# Patient Record
Sex: MALE | Race: WHITE | NOT HISPANIC OR LATINO | RURAL
[De-identification: names, ages, dates, MRNs, and addresses within clinical notes are randomized per-mention and may not be internally consistent; named-entity substitution may affect disease eponyms.]

---

## 2021-11-19 ENCOUNTER — CLINICAL SUPPORT (OUTPATIENT)
Dept: PRIMARY CARE CLINIC | Facility: CLINIC | Age: 36
End: 2021-11-19

## 2021-11-19 DIAGNOSIS — Z02.89 ENCOUNTER FOR PHYSICAL EXAMINATION RELATED TO EMPLOYMENT: ICD-10-CM

## 2021-11-19 PROCEDURE — 99000 PR SPECIMEN HANDLING,DR OFF->LAB: ICD-10-PCS | Mod: ,,, | Performed by: NURSE PRACTITIONER

## 2021-11-19 PROCEDURE — 99000 SPECIMEN HANDLING OFFICE-LAB: CPT | Mod: ,,, | Performed by: NURSE PRACTITIONER

## 2021-11-19 PROCEDURE — 99172 OCULAR FUNCTION SCREEN: CPT | Mod: ,,, | Performed by: NURSE PRACTITIONER

## 2021-11-19 PROCEDURE — 92552 PURE TONE AUDIOMETRY AIR: CPT | Mod: ,,, | Performed by: NURSE PRACTITIONER

## 2021-11-19 PROCEDURE — 99080 OSHA QUESTIONNAIRE: ICD-10-PCS | Mod: ,,, | Performed by: NURSE PRACTITIONER

## 2021-11-19 PROCEDURE — 99080 SPECIAL REPORTS OR FORMS: CPT | Mod: ,,, | Performed by: NURSE PRACTITIONER

## 2021-11-19 PROCEDURE — 99172 PR VISUAL FUNCT SCREENING, BILAT: ICD-10-PCS | Mod: ,,, | Performed by: NURSE PRACTITIONER

## 2021-11-19 PROCEDURE — 92552 PR PURE TONE AUDIOMETRY, AIR: ICD-10-PCS | Mod: ,,, | Performed by: NURSE PRACTITIONER

## 2022-05-03 ENCOUNTER — CLINICAL SUPPORT (OUTPATIENT)
Dept: PRIMARY CARE CLINIC | Facility: CLINIC | Age: 37
End: 2022-05-03

## 2022-05-03 DIAGNOSIS — Z01.10 ENCOUNTER FOR HEARING EXAMINATION, UNSPECIFIED WHETHER ABNORMAL FINDINGS: Primary | ICD-10-CM

## 2022-05-03 DIAGNOSIS — Z02.83 ENCOUNTER FOR DRUG SCREENING: ICD-10-CM

## 2022-05-03 DIAGNOSIS — Z02.89 ENCOUNTER FOR PHYSICAL EXAMINATION RELATED TO EMPLOYMENT: ICD-10-CM

## 2022-05-03 PROCEDURE — 99499 PR PHYSICAL - BASIC NON DOT/CDL: ICD-10-PCS | Mod: ,,, | Performed by: NURSE PRACTITIONER

## 2022-05-03 PROCEDURE — 99499 UNLISTED E&M SERVICE: CPT | Mod: ,,, | Performed by: NURSE PRACTITIONER

## 2022-05-03 PROCEDURE — 92552 PR PURE TONE AUDIOMETRY, AIR: ICD-10-PCS | Mod: ,,, | Performed by: NURSE PRACTITIONER

## 2022-05-03 PROCEDURE — 92552 PURE TONE AUDIOMETRY AIR: CPT | Mod: ,,, | Performed by: NURSE PRACTITIONER

## 2022-05-03 PROCEDURE — 99000 PR SPECIMEN HANDLING,DR OFF->LAB: ICD-10-PCS | Mod: ,,, | Performed by: NURSE PRACTITIONER

## 2022-05-03 PROCEDURE — 99000 SPECIMEN HANDLING OFFICE-LAB: CPT | Mod: ,,, | Performed by: NURSE PRACTITIONER

## 2022-05-03 NOTE — PROGRESS NOTES
Subjective:       Patient ID: Paola Liu is a 36 y.o. male.    Chief Complaint: No chief complaint on file.    HPI  Review of Systems      Objective:      Physical Exam    Assessment:       Problem List Items Addressed This Visit    None     Visit Diagnoses     Encounter for hearing examination, unspecified whether abnormal findings    -  Primary    Encounter for drug screening        Encounter for physical examination related to employment              Plan:       See scanned documents in .

## 2022-05-17 ENCOUNTER — OFFICE VISIT (OUTPATIENT)
Dept: FAMILY MEDICINE | Facility: CLINIC | Age: 37
End: 2022-05-17
Payer: COMMERCIAL

## 2022-05-17 VITALS
SYSTOLIC BLOOD PRESSURE: 118 MMHG | DIASTOLIC BLOOD PRESSURE: 80 MMHG | RESPIRATION RATE: 17 BRPM | OXYGEN SATURATION: 96 % | WEIGHT: 180 LBS | HEART RATE: 67 BPM | HEIGHT: 67 IN | BODY MASS INDEX: 28.25 KG/M2 | TEMPERATURE: 98 F

## 2022-05-17 DIAGNOSIS — J02.9 SORE THROAT: ICD-10-CM

## 2022-05-17 DIAGNOSIS — J01.10 ACUTE NON-RECURRENT FRONTAL SINUSITIS: Primary | ICD-10-CM

## 2022-05-17 DIAGNOSIS — R05.9 COUGH: ICD-10-CM

## 2022-05-17 PROBLEM — S02.19XA: Status: ACTIVE | Noted: 2018-11-21

## 2022-05-17 PROBLEM — S02.129A: Status: ACTIVE | Noted: 2018-11-21

## 2022-05-17 LAB
CTP QC/QA: YES
S PYO RRNA THROAT QL PROBE: NEGATIVE

## 2022-05-17 PROCEDURE — 3074F SYST BP LT 130 MM HG: CPT | Mod: ,,, | Performed by: NURSE PRACTITIONER

## 2022-05-17 PROCEDURE — 1159F MED LIST DOCD IN RCRD: CPT | Mod: ,,, | Performed by: NURSE PRACTITIONER

## 2022-05-17 PROCEDURE — 87880 POCT RAPID STREP A: ICD-10-PCS | Mod: QW,,, | Performed by: NURSE PRACTITIONER

## 2022-05-17 PROCEDURE — 3079F PR MOST RECENT DIASTOLIC BLOOD PRESSURE 80-89 MM HG: ICD-10-PCS | Mod: ,,, | Performed by: NURSE PRACTITIONER

## 2022-05-17 PROCEDURE — 3074F PR MOST RECENT SYSTOLIC BLOOD PRESSURE < 130 MM HG: ICD-10-PCS | Mod: ,,, | Performed by: NURSE PRACTITIONER

## 2022-05-17 PROCEDURE — 3079F DIAST BP 80-89 MM HG: CPT | Mod: ,,, | Performed by: NURSE PRACTITIONER

## 2022-05-17 PROCEDURE — 1160F RVW MEDS BY RX/DR IN RCRD: CPT | Mod: ,,, | Performed by: NURSE PRACTITIONER

## 2022-05-17 PROCEDURE — 3008F BODY MASS INDEX DOCD: CPT | Mod: ,,, | Performed by: NURSE PRACTITIONER

## 2022-05-17 PROCEDURE — 3008F PR BODY MASS INDEX (BMI) DOCUMENTED: ICD-10-PCS | Mod: ,,, | Performed by: NURSE PRACTITIONER

## 2022-05-17 PROCEDURE — 87880 STREP A ASSAY W/OPTIC: CPT | Mod: QW,,, | Performed by: NURSE PRACTITIONER

## 2022-05-17 PROCEDURE — 99213 OFFICE O/P EST LOW 20 MIN: CPT | Mod: ,,, | Performed by: NURSE PRACTITIONER

## 2022-05-17 PROCEDURE — 1160F PR REVIEW ALL MEDS BY PRESCRIBER/CLIN PHARMACIST DOCUMENTED: ICD-10-PCS | Mod: ,,, | Performed by: NURSE PRACTITIONER

## 2022-05-17 PROCEDURE — 99213 PR OFFICE/OUTPT VISIT, EST, LEVL III, 20-29 MIN: ICD-10-PCS | Mod: ,,, | Performed by: NURSE PRACTITIONER

## 2022-05-17 PROCEDURE — 1159F PR MEDICATION LIST DOCUMENTED IN MEDICAL RECORD: ICD-10-PCS | Mod: ,,, | Performed by: NURSE PRACTITIONER

## 2022-05-17 RX ORDER — AZITHROMYCIN 250 MG/1
TABLET, FILM COATED ORAL
Qty: 6 TABLET | Refills: 0 | Status: SHIPPED | OUTPATIENT
Start: 2022-05-17

## 2022-05-17 RX ORDER — LORATADINE PSEUDOEPHEDRINE SULFATE 10; 240 MG/1; MG/1
1 TABLET, EXTENDED RELEASE ORAL DAILY
Qty: 10 TABLET | Refills: 0 | COMMUNITY
Start: 2022-05-17 | End: 2022-05-27

## 2022-05-17 RX ORDER — CHLOPHEDIANOL HCL AND PYRILAMINE MALEATE 12.5; 12.5 MG/5ML; MG/5ML
10 SOLUTION ORAL NIGHTLY PRN
Qty: 473 ML | Refills: 0 | Status: SHIPPED | OUTPATIENT
Start: 2022-05-17

## 2022-05-17 NOTE — PROGRESS NOTES
[facility]    Chief Complaint      Chief Complaint   Patient presents with    Cough     Mostly at night about x 4 or 5 days    Sore Throat     X 4 days    Nasal Congestion     This morning. Pt states covid test neg.      History of Present Illness      Paola Liu is a 36 y.o. male  who presents today for c/o URI symptoms x4-5 days.    Sinusitis  This is a new problem. The current episode started in the past 7 days. The problem has been gradually worsening since onset. There has been no fever. Associated symptoms include congestion, coughing (worse at night), headaches, sneezing and a sore throat. Pertinent negatives include no chills, ear pain, hoarse voice, neck pain, shortness of breath or sinus pressure. Past treatments include acetaminophen and oral decongestants. The treatment provided mild relief.     Past Medical History:  No past medical history on file.    Past Surgical History:   has no past surgical history on file.    Social History:       I personally reviewed all past medical, surgical, and social.     Review of Systems   Constitutional: Negative for chills, fever and malaise/fatigue.   HENT: Positive for congestion, sneezing and sore throat. Negative for ear discharge, ear pain, hoarse voice, sinus pressure and sinus pain.    Eyes: Negative for discharge and redness.   Respiratory: Positive for cough (worse at night) and sputum production (yellow). Negative for shortness of breath and wheezing.    Cardiovascular: Negative for chest pain.   Gastrointestinal: Negative for abdominal pain, diarrhea, nausea and vomiting.   Genitourinary: Negative for dysuria, frequency and urgency.   Musculoskeletal: Negative for neck pain.   Skin: Negative for itching and rash.   Neurological: Positive for headaches.   Endo/Heme/Allergies: Negative for environmental allergies. Does not bruise/bleed easily.   Psychiatric/Behavioral: Negative for depression and suicidal ideas.     "  Medications:  Outpatient Encounter Medications as of 5/17/2022   Medication Sig Dispense Refill    azithromycin (ZITHROMAX Z-OSVALDO) 250 MG tablet Take 2 tablets on day #1, then 1 tablet on days 2-5 6 tablet 0    loratadine-pseudoephedrine  mg (CLARITIN-D 24 HOUR)  mg per 24 hr tablet Take 1 tablet by mouth once daily. for 10 days 10 tablet 0    pyrilamine-chlophedianoL (NINJACOF) 12.5-12.5 mg/5 mL Liqd Take 10 mLs by mouth nightly as needed (cough). 473 mL 0     No facility-administered encounter medications on file as of 5/17/2022.     Allergies:  Review of patient's allergies indicates:  No Known Allergies    Health Maintenance:    There is no immunization history on file for this patient.   Health Maintenance   Topic Date Due    Hepatitis C Screening  Never done    TETANUS VACCINE  Never done    Lipid Panel  04/26/2026      Physical Exam      Vital Signs  Temp: 98.4 °F (36.9 °C)  Pulse: 67  Resp: 17  SpO2: 96 %  BP: 118/80  Pain Score:   5  Height and Weight  Height: 5' 7" (170.2 cm)  Weight: 81.6 kg (180 lb)  BSA (Calculated - sq m): 1.96 sq meters  BMI (Calculated): 28.2  Weight in (lb) to have BMI = 25: 159.3]    Physical Exam  Vitals and nursing note reviewed.   Constitutional:       Appearance: Normal appearance.   HENT:      Head: Normocephalic.      Right Ear: External ear normal.      Left Ear: External ear normal.      Nose: Congestion present.      Right Turbinates: Swollen.      Left Turbinates: Swollen.      Right Sinus: Frontal sinus tenderness present.      Left Sinus: Frontal sinus tenderness present.      Mouth/Throat:      Mouth: Mucous membranes are moist.   Eyes:      Conjunctiva/sclera: Conjunctivae normal.      Pupils: Pupils are equal, round, and reactive to light.   Cardiovascular:      Rate and Rhythm: Normal rate and regular rhythm.      Pulses: Normal pulses.      Heart sounds: Normal heart sounds. No murmur heard.  Pulmonary:      Effort: Pulmonary effort is normal. No " respiratory distress.      Breath sounds: Normal breath sounds.   Abdominal:      General: Bowel sounds are normal.   Musculoskeletal:         General: Normal range of motion.      Cervical back: Normal range of motion.   Skin:     General: Skin is warm and dry.      Capillary Refill: Capillary refill takes less than 2 seconds.   Neurological:      Mental Status: He is alert and oriented to person, place, and time.   Psychiatric:         Mood and Affect: Mood normal.         Behavior: Behavior normal.         Thought Content: Thought content normal.         Judgment: Judgment normal.          Laboratory:  CBC:  Recent Labs   Lab 04/26/21  1038   WBC 5.08   RBC 5.02   Hemoglobin 15.4   Hematocrit 46.6   Platelet Count 335   MCV 92.8   MCH 30.7   MCHC 33.0     CMP:  Recent Labs   Lab 04/26/21  1038   Glucose 67 L   Calcium 8.9   Albumin 4.1   Total Protein 7.5   Sodium 139   Potassium 4.3   CO2 29   Chloride 105   BUN 11   Alk Phos 66   ALT 28   AST 25   Bilirubin, Total 0.9     LIPIDS:  Recent Labs   Lab 04/26/21  1038   TSH 1.220   HDL Cholesterol 68 H   Cholesterol 218 H   Triglycerides 154 H   LDL Calculated 119   Cholesterol/HDL Ratio (Risk Factor) 3.2   Non-     TSH:  Recent Labs   Lab 04/26/21  1038   TSH 1.220     Assessment/Plan     Paola Liu is a 36 y.o.male with:    1. Sore throat  - POCT rapid strep A    2. Acute non-recurrent frontal sinusitis  - loratadine-pseudoephedrine  mg (CLARITIN-D 24 HOUR)  mg per 24 hr tablet; Take 1 tablet by mouth once daily. for 10 days  Dispense: 10 tablet; Refill: 0  - azithromycin (ZITHROMAX Z-OSVALDO) 250 MG tablet; Take 2 tablets on day #1, then 1 tablet on days 2-5  Dispense: 6 tablet; Refill: 0    3. Cough  - pyrilamine-chlophedianoL (NINJACOF) 12.5-12.5 mg/5 mL Liqd; Take 10 mLs by mouth nightly as needed (cough).  Dispense: 473 mL; Refill: 0  -Start Z-osvaldo in 2-3 days if symptoms worsen or do not improve.     Chronic conditions status updated  as per HPI.  Other than changes above, cont current medications and maintain follow up with specialists.  Return to clinic as needed.     JACOB OmalleyP  [facility]

## 2022-05-17 NOTE — LETTER
May 17, 2022      [facility]  [address] [facility] [address]  Phone: [phone]  Fax: [phone]       Patient: Paola Liu   YOB: 1985  Date of Visit: 05/17/2022    To Whom It May Concern:    BELLE Liu  was at [facility] on 05/17/2022. The patient may return to work on 05/18/2022 with no restrictions. If you have any questions or concerns, or if I can be of further assistance, please do not hesitate to contact me.    Sincerely,    RADHA Omalley

## 2023-06-15 ENCOUNTER — CLINICAL SUPPORT (OUTPATIENT)
Dept: PRIMARY CARE CLINIC | Facility: CLINIC | Age: 38
End: 2023-06-15

## 2023-06-15 DIAGNOSIS — Z02.83 ENCOUNTER FOR DRUG SCREENING: ICD-10-CM

## 2023-06-15 DIAGNOSIS — Z02.4 ENCOUNTER FOR DEPARTMENT OF TRANSPORTATION (DOT) EXAMINATION FOR DRIVING LICENSE RENEWAL: Primary | ICD-10-CM

## 2023-06-15 DIAGNOSIS — Z01.10 ENCOUNTER FOR HEARING EXAMINATION, UNSPECIFIED WHETHER ABNORMAL FINDINGS: ICD-10-CM

## 2023-06-15 PROCEDURE — 92552 PURE TONE AUDIOMETRY AIR: CPT | Mod: ,,, | Performed by: NURSE PRACTITIONER

## 2023-06-15 PROCEDURE — 99499 PR PHYSICAL - DOT/CDL: ICD-10-PCS | Mod: ,,, | Performed by: NURSE PRACTITIONER

## 2023-06-15 PROCEDURE — 99000 PR SPECIMEN HANDLING,DR OFF->LAB: ICD-10-PCS | Mod: ,,, | Performed by: NURSE PRACTITIONER

## 2023-06-15 PROCEDURE — 92552 PR PURE TONE AUDIOMETRY, AIR: ICD-10-PCS | Mod: ,,, | Performed by: NURSE PRACTITIONER

## 2023-06-15 PROCEDURE — 99000 SPECIMEN HANDLING OFFICE-LAB: CPT | Mod: ,,, | Performed by: NURSE PRACTITIONER

## 2023-06-15 PROCEDURE — 99499 UNLISTED E&M SERVICE: CPT | Mod: ,,, | Performed by: NURSE PRACTITIONER

## 2023-06-15 NOTE — PROGRESS NOTES
Subjective     Patient ID: Paola Liu is a 37 y.o. male.    Chief Complaint: No chief complaint on file.    HPI  Review of Systems       Objective     Physical Exam       Assessment and Plan     1. Encounter for Department of Transportation (DOT) examination for driving license renewal    2. Encounter for hearing examination, unspecified whether abnormal findings    3. Encounter for drug screening        See scanned documents in .            No follow-ups on file.

## 2023-09-12 ENCOUNTER — OFFICE VISIT (OUTPATIENT)
Dept: FAMILY MEDICINE | Facility: CLINIC | Age: 38
End: 2023-09-12
Payer: COMMERCIAL

## 2023-09-12 VITALS
OXYGEN SATURATION: 98 % | BODY MASS INDEX: 26.53 KG/M2 | WEIGHT: 169 LBS | DIASTOLIC BLOOD PRESSURE: 74 MMHG | SYSTOLIC BLOOD PRESSURE: 124 MMHG | HEART RATE: 84 BPM | TEMPERATURE: 98 F | RESPIRATION RATE: 16 BRPM | HEIGHT: 67 IN

## 2023-09-12 DIAGNOSIS — K12.2 UVULITIS: Primary | ICD-10-CM

## 2023-09-12 DIAGNOSIS — J02.9 SORE THROAT: ICD-10-CM

## 2023-09-12 LAB
CTP QC/QA: YES
HETEROPH AB SER QL LA: NEGATIVE
S PYO RRNA THROAT QL PROBE: NEGATIVE

## 2023-09-12 PROCEDURE — 87880 STREP A ASSAY W/OPTIC: CPT | Mod: QW,,, | Performed by: STUDENT IN AN ORGANIZED HEALTH CARE EDUCATION/TRAINING PROGRAM

## 2023-09-12 PROCEDURE — 86308 HETEROPHILE ANTIBODY SCREEN: CPT | Mod: ,,, | Performed by: CLINICAL MEDICAL LABORATORY

## 2023-09-12 PROCEDURE — 86308 HETEROPHILE AB SCREEN: ICD-10-PCS | Mod: ,,, | Performed by: CLINICAL MEDICAL LABORATORY

## 2023-09-12 PROCEDURE — 3078F DIAST BP <80 MM HG: CPT | Mod: ,,, | Performed by: STUDENT IN AN ORGANIZED HEALTH CARE EDUCATION/TRAINING PROGRAM

## 2023-09-12 PROCEDURE — 86665 EPSTEIN-BARR CAPSID VCA: CPT | Mod: XU,,, | Performed by: CLINICAL MEDICAL LABORATORY

## 2023-09-12 PROCEDURE — 3074F SYST BP LT 130 MM HG: CPT | Mod: ,,, | Performed by: STUDENT IN AN ORGANIZED HEALTH CARE EDUCATION/TRAINING PROGRAM

## 2023-09-12 PROCEDURE — 99214 PR OFFICE/OUTPT VISIT, EST, LEVL IV, 30-39 MIN: ICD-10-PCS | Mod: 25,,, | Performed by: STUDENT IN AN ORGANIZED HEALTH CARE EDUCATION/TRAINING PROGRAM

## 2023-09-12 PROCEDURE — 1159F PR MEDICATION LIST DOCUMENTED IN MEDICAL RECORD: ICD-10-PCS | Mod: ,,, | Performed by: STUDENT IN AN ORGANIZED HEALTH CARE EDUCATION/TRAINING PROGRAM

## 2023-09-12 PROCEDURE — 96372 PR INJECTION,THERAP/PROPH/DIAG2ST, IM OR SUBCUT: ICD-10-PCS | Mod: ,,, | Performed by: STUDENT IN AN ORGANIZED HEALTH CARE EDUCATION/TRAINING PROGRAM

## 2023-09-12 PROCEDURE — 3008F BODY MASS INDEX DOCD: CPT | Mod: ,,, | Performed by: STUDENT IN AN ORGANIZED HEALTH CARE EDUCATION/TRAINING PROGRAM

## 2023-09-12 PROCEDURE — 1159F MED LIST DOCD IN RCRD: CPT | Mod: ,,, | Performed by: STUDENT IN AN ORGANIZED HEALTH CARE EDUCATION/TRAINING PROGRAM

## 2023-09-12 PROCEDURE — 3078F PR MOST RECENT DIASTOLIC BLOOD PRESSURE < 80 MM HG: ICD-10-PCS | Mod: ,,, | Performed by: STUDENT IN AN ORGANIZED HEALTH CARE EDUCATION/TRAINING PROGRAM

## 2023-09-12 PROCEDURE — 3074F PR MOST RECENT SYSTOLIC BLOOD PRESSURE < 130 MM HG: ICD-10-PCS | Mod: ,,, | Performed by: STUDENT IN AN ORGANIZED HEALTH CARE EDUCATION/TRAINING PROGRAM

## 2023-09-12 PROCEDURE — 4010F ACE/ARB THERAPY RXD/TAKEN: CPT | Mod: ,,, | Performed by: STUDENT IN AN ORGANIZED HEALTH CARE EDUCATION/TRAINING PROGRAM

## 2023-09-12 PROCEDURE — 86665 EPSTEIN-BARR CAPSID VCA: CPT | Mod: ,,, | Performed by: CLINICAL MEDICAL LABORATORY

## 2023-09-12 PROCEDURE — 99214 OFFICE O/P EST MOD 30 MIN: CPT | Mod: 25,,, | Performed by: STUDENT IN AN ORGANIZED HEALTH CARE EDUCATION/TRAINING PROGRAM

## 2023-09-12 PROCEDURE — 96372 THER/PROPH/DIAG INJ SC/IM: CPT | Mod: ,,, | Performed by: STUDENT IN AN ORGANIZED HEALTH CARE EDUCATION/TRAINING PROGRAM

## 2023-09-12 PROCEDURE — 4010F PR ACE/ARB THEARPY RXD/TAKEN: ICD-10-PCS | Mod: ,,, | Performed by: STUDENT IN AN ORGANIZED HEALTH CARE EDUCATION/TRAINING PROGRAM

## 2023-09-12 PROCEDURE — 86665 EPSTEIN-BARR VIRUS VCA, IGM: ICD-10-PCS | Mod: XU,,, | Performed by: CLINICAL MEDICAL LABORATORY

## 2023-09-12 PROCEDURE — 87880 POCT RAPID STREP A: ICD-10-PCS | Mod: QW,,, | Performed by: STUDENT IN AN ORGANIZED HEALTH CARE EDUCATION/TRAINING PROGRAM

## 2023-09-12 PROCEDURE — 3008F PR BODY MASS INDEX (BMI) DOCUMENTED: ICD-10-PCS | Mod: ,,, | Performed by: STUDENT IN AN ORGANIZED HEALTH CARE EDUCATION/TRAINING PROGRAM

## 2023-09-12 RX ORDER — METHYLPREDNISOLONE ACETATE 40 MG/ML
40 INJECTION, SUSPENSION INTRA-ARTICULAR; INTRALESIONAL; INTRAMUSCULAR; SOFT TISSUE ONCE
Status: COMPLETED | OUTPATIENT
Start: 2023-09-12 | End: 2023-09-12

## 2023-09-12 RX ORDER — DEXTROAMPHETAMINE SACCHARATE, AMPHETAMINE ASPARTATE, DEXTROAMPHETAMINE SULFATE AND AMPHETAMINE SULFATE 5; 5; 5; 5 MG/1; MG/1; MG/1; MG/1
1 TABLET ORAL 2 TIMES DAILY
COMMUNITY
Start: 2023-08-15

## 2023-09-12 RX ORDER — IBUPROFEN 800 MG/1
800 TABLET ORAL 3 TIMES DAILY
COMMUNITY
Start: 2023-06-07 | End: 2023-09-12 | Stop reason: SDUPTHER

## 2023-09-12 RX ORDER — DEXAMETHASONE SODIUM PHOSPHATE 4 MG/ML
4 INJECTION, SOLUTION INTRA-ARTICULAR; INTRALESIONAL; INTRAMUSCULAR; INTRAVENOUS; SOFT TISSUE
Status: COMPLETED | OUTPATIENT
Start: 2023-09-12 | End: 2023-09-12

## 2023-09-12 RX ORDER — KETOROLAC TROMETHAMINE 30 MG/ML
60 INJECTION, SOLUTION INTRAMUSCULAR; INTRAVENOUS
Status: COMPLETED | OUTPATIENT
Start: 2023-09-12 | End: 2023-09-12

## 2023-09-12 RX ORDER — IBUPROFEN 800 MG/1
800 TABLET ORAL 3 TIMES DAILY
Qty: 20 TABLET | Refills: 0 | Status: SHIPPED | OUTPATIENT
Start: 2023-09-12

## 2023-09-12 RX ORDER — PREDNISONE 20 MG/1
20 TABLET ORAL DAILY
Qty: 3 TABLET | Refills: 0 | Status: SHIPPED | OUTPATIENT
Start: 2023-09-12 | End: 2023-09-15

## 2023-09-12 RX ADMIN — METHYLPREDNISOLONE ACETATE 40 MG: 40 INJECTION, SUSPENSION INTRA-ARTICULAR; INTRALESIONAL; INTRAMUSCULAR; SOFT TISSUE at 10:09

## 2023-09-12 RX ADMIN — KETOROLAC TROMETHAMINE 60 MG: 30 INJECTION, SOLUTION INTRAMUSCULAR; INTRAVENOUS at 10:09

## 2023-09-12 RX ADMIN — DEXAMETHASONE SODIUM PHOSPHATE 4 MG: 4 INJECTION, SOLUTION INTRA-ARTICULAR; INTRALESIONAL; INTRAMUSCULAR; INTRAVENOUS; SOFT TISSUE at 10:09

## 2023-09-12 NOTE — PROGRESS NOTES
"[facility]    Chief Complaint      Chief Complaint   Patient presents with    Sore Throat     Swollen throat, neck tenderness woke up yesterday morning with ST    Hoarse       History of Present Illness      Paola Liu is a 38 y.o. male with chronic conditions of ADHD who presents today for Sore throat x's 1 day, c/o gagging with eating and drinking and states "my uvula is swollen".   Endorses history of this x's 1 when he was school age. Also states was recently exposed to Mono, nephew has it.       Past Medical History:  Past Medical History:   Diagnosis Date    ADHD        Past Surgical History:   has a past surgical history that includes Tonsillectomy.    Social History:  Social History     Tobacco Use    Smoking status: Never     Passive exposure: Never    Smokeless tobacco: Never   Substance Use Topics    Alcohol use: Not Currently     Comment: occ    Drug use: Never       I personally reviewed all past medical, surgical, and social.     Review of Systems   Constitutional:  Negative for appetite change, chills, fatigue and fever.   HENT:  Positive for sore throat. Negative for nasal congestion, ear discharge, ear pain, nosebleeds, postnasal drip, rhinorrhea, sinus pressure/congestion, sneezing and voice change.    Eyes:  Negative for pain, discharge, redness and itching.   Gastrointestinal:  Negative for abdominal pain, diarrhea, nausea and vomiting.   Allergic/Immunologic: Negative for environmental allergies.   Neurological:  Negative for headaches.   All other systems reviewed and are negative.       Medications:  Outpatient Encounter Medications as of 9/12/2023   Medication Sig Dispense Refill    dextroamphetamine-amphetamine (ADDERALL) 20 mg tablet Take 1 tablet by mouth 2 (two) times daily.      [DISCONTINUED] ibuprofen (ADVIL,MOTRIN) 800 MG tablet Take 800 mg by mouth 3 (three) times daily.      azithromycin (ZITHROMAX Z-OSVALDO) 250 MG tablet Take 2 tablets on day #1, then 1 tablet on " "days 2-5 (Patient not taking: Reported on 9/12/2023) 6 tablet 0    ibuprofen (ADVIL,MOTRIN) 800 MG tablet Take 1 tablet (800 mg total) by mouth 3 (three) times daily. 20 tablet 0    predniSONE (DELTASONE) 20 MG tablet Take 1 tablet (20 mg total) by mouth once daily. for 3 days 3 tablet 0    pyrilamine-chlophedianoL (NINJACOF) 12.5-12.5 mg/5 mL Liqd Take 10 mLs by mouth nightly as needed (cough). (Patient not taking: Reported on 9/12/2023) 473 mL 0     Facility-Administered Encounter Medications as of 9/12/2023   Medication Dose Route Frequency Provider Last Rate Last Admin    [COMPLETED] dexAMETHasone injection 4 mg  4 mg Intramuscular 1 time in Clinic/HOD Magalie Wynn, FNP   4 mg at 09/12/23 1019    [COMPLETED] ketorolac injection 60 mg  60 mg Intramuscular 1 time in Clinic/HOD Magalie Wynn, FNP   60 mg at 09/12/23 1020    [COMPLETED] methylPREDNISolone acetate injection 40 mg  40 mg Intramuscular Once Magalie Wynn, FNP   40 mg at 09/12/23 1021       Allergies:  Review of patient's allergies indicates:  No Known Allergies    Health Maintenance:    There is no immunization history on file for this patient.   Health Maintenance   Topic Date Due    Hepatitis C Screening  Never done    TETANUS VACCINE  Never done    Lipid Panel  07/12/2027        Physical Exam      Vital Signs  Temp: 97.9 °F (36.6 °C)  Temp Source: Oral  Pulse: 84  Resp: 16  SpO2: 98 %  BP: 124/74  BP Location: Right arm  Patient Position: Sitting  Height and Weight  Height: 5' 7" (170.2 cm)  Weight: 76.7 kg (169 lb)  BSA (Calculated - sq m): 1.9 sq meters  BMI (Calculated): 26.5  Weight in (lb) to have BMI = 25: 159.3]    Physical Exam  Constitutional:       General: He is not in acute distress.     Appearance: Normal appearance.   HENT:      Nose: No congestion or rhinorrhea.      Mouth/Throat:      Pharynx: Posterior oropharyngeal erythema and uvula swelling present. No oropharyngeal exudate.   Cardiovascular:      Rate and Rhythm: " Normal rate and regular rhythm.   Pulmonary:      Effort: Pulmonary effort is normal.      Breath sounds: Normal breath sounds.   Lymphadenopathy:      Cervical: Cervical adenopathy present.   Skin:     General: Skin is warm and dry.   Neurological:      Mental Status: He is alert.          Laboratory:  CBC:  Recent Labs   Lab 04/26/21  1038 07/12/22  1500   WBC 5.08 5.22   RBC 5.02 4.73   Hemoglobin 15.4 14.9   Hematocrit 46.6 43.2   Platelet Count 335 327   MCV 92.8 91.3   MCH 30.7 31.5 H   MCHC 33.0 34.5     CMP:  Recent Labs   Lab 04/26/21  1038 07/12/22  1500   Glucose 67 L 84   Calcium 8.9 8.8   Albumin 4.1 3.8   Total Protein 7.5 7.2   Sodium 139 139   Potassium 4.3 4.3   CO2 29 30   Chloride 105 106   BUN 11 10   Alk Phos 66 94   ALT 28 28   AST 25 18   Bilirubin, Total 0.9 1.0     LIPIDS:  Recent Labs   Lab 04/26/21  1038 07/12/22  1500   TSH 1.220  --    HDL Cholesterol 68 H 60   Cholesterol 218 H 234 H   Triglycerides 154 H 135   LDL Calculated 119 147   Cholesterol/HDL Ratio (Risk Factor) 3.2 3.9   Non- 174     TSH:  Recent Labs   Lab 04/26/21  1038   TSH 1.220     A1C:        Assessment/Plan     Paola Liu is a 38 y.o.male with:    1. Uvulitis  -     ketorolac injection 60 mg  -     dexAMETHasone injection 4 mg  -     methylPREDNISolone acetate injection 40 mg  -     predniSONE (DELTASONE) 20 MG tablet; Take 1 tablet (20 mg total) by mouth once daily. for 3 days  Dispense: 3 tablet; Refill: 0  -     ibuprofen (ADVIL,MOTRIN) 800 MG tablet; Take 1 tablet (800 mg total) by mouth 3 (three) times daily.  Dispense: 20 tablet; Refill: 0    2. Sore throat  -     POCT rapid strep A  -     Heterophile Ab Screen; Future; Expected date: 09/12/2023  -     Andrei-Barr Virus (EBV) Antibodies To VCA, IgG; Future; Expected date: 09/12/2023  -     Andrei-Barr Virus (EBV) Antibodies To VCA, IgM; Future; Expected date: 09/12/2023         Chronic conditions status updated as per HPI.  Other than changes  above, cont current medications and maintain follow up with specialists.  Return to clinic as needed.     Patient Instructions   To help ease a sore throat you can:  Use a sore throat spray.  Suck on hard candy or throat lozenges.  Gargle with warm saltwater a few times each day. Mix 1/2  teaspoon (2.5 grams) salt in 8 ounces (240 mL) of warm water  You may want to take medicine like acetaminophen, ibuprofen, or naproxen for pain.  If you decide to take over-the-counter cough or cold medicines, follow the directions on the label carefully. Be sure you do not take more than one medicine that contains acetaminophen. Also, if you have a heart problem or high blood pressure, check with your doctor before you take any of these medicines.  Wash your hands often. This will help keep others healthy.  Do not smoke. Stay away from others who are smoking.  Do not take Ibuprofen for at least 6-8 hours after your Toradol injection today  Do not take Motrin on an empty stomach  We will contact you with your lab results once received      RADHA Tavarez-BC  [facility]

## 2023-09-12 NOTE — PATIENT INSTRUCTIONS
To help ease a sore throat you can:  Use a sore throat spray.  Suck on hard candy or throat lozenges.  Gargle with warm saltwater a few times each day. Mix 1/2  teaspoon (2.5 grams) salt in 8 ounces (240 mL) of warm water  You may want to take medicine like acetaminophen, ibuprofen, or naproxen for pain.  If you decide to take over-the-counter cough or cold medicines, follow the directions on the label carefully. Be sure you do not take more than one medicine that contains acetaminophen. Also, if you have a heart problem or high blood pressure, check with your doctor before you take any of these medicines.  Wash your hands often. This will help keep others healthy.  Do not smoke. Stay away from others who are smoking.  Do not take Ibuprofen for at least 6-8 hours after your Toradol injection today  Do not take Motrin on an empty stomach  We will contact you with your lab results once received

## 2023-09-14 LAB
ALPHA1 GLOB MFR CSF ELPH: >3.4 % (ref 0–0.9)
ALPHA2 GLOB MFR CSF ELPH: 0.13 % (ref 0–0.9)
EBV VCA IGG SER QL IA: POSITIVE
EBV VCA IGM SER QL IA: NEGATIVE

## 2024-05-28 ENCOUNTER — HOSPITAL ENCOUNTER (OUTPATIENT)
Dept: RADIOLOGY | Facility: HOSPITAL | Age: 39
Discharge: HOME | End: 2024-05-28
Attending: ORTHOPAEDIC SURGERY
Payer: COMMERCIAL

## 2024-05-28 ENCOUNTER — OFFICE VISIT (OUTPATIENT)
Dept: ORTHOPEDICS | Facility: CLINIC | Age: 39
End: 2024-05-28
Payer: COMMERCIAL

## 2024-05-28 DIAGNOSIS — M79.652 LEFT THIGH PAIN: Primary | ICD-10-CM

## 2024-05-28 DIAGNOSIS — M79.652 LEFT THIGH PAIN: ICD-10-CM

## 2024-05-28 DIAGNOSIS — M54.16 LUMBAR RADICULOPATHY: ICD-10-CM

## 2024-05-28 DIAGNOSIS — M54.50 LEFT-SIDED LOW BACK PAIN WITHOUT SCIATICA, UNSPECIFIED CHRONICITY: ICD-10-CM

## 2024-05-28 PROCEDURE — 73552 X-RAY EXAM OF FEMUR 2/>: CPT | Mod: 26,LT,, | Performed by: ORTHOPAEDIC SURGERY

## 2024-05-28 PROCEDURE — 99204 OFFICE O/P NEW MOD 45 MIN: CPT | Mod: S$PBB,,, | Performed by: ORTHOPAEDIC SURGERY

## 2024-05-28 PROCEDURE — 72100 X-RAY EXAM L-S SPINE 2/3 VWS: CPT | Mod: TC

## 2024-05-28 PROCEDURE — 72100 X-RAY EXAM L-S SPINE 2/3 VWS: CPT | Mod: 26,,, | Performed by: ORTHOPAEDIC SURGERY

## 2024-05-28 PROCEDURE — 99213 OFFICE O/P EST LOW 20 MIN: CPT | Mod: PBBFAC,25 | Performed by: ORTHOPAEDIC SURGERY

## 2024-05-28 PROCEDURE — 73552 X-RAY EXAM OF FEMUR 2/>: CPT | Mod: TC,LT

## 2024-05-28 RX ORDER — AMLODIPINE BESYLATE 5 MG/1
5 TABLET ORAL
COMMUNITY
Start: 2024-04-09

## 2024-05-28 RX ORDER — NAPROXEN 500 MG/1
500 TABLET ORAL 2 TIMES DAILY WITH MEALS
Qty: 60 TABLET | Refills: 3 | Status: SHIPPED | OUTPATIENT
Start: 2024-05-28

## 2024-05-28 RX ORDER — METHYLPREDNISOLONE 4 MG/1
TABLET ORAL
Qty: 21 EACH | Refills: 0 | Status: SHIPPED | OUTPATIENT
Start: 2024-05-28 | End: 2024-06-18

## 2024-05-28 NOTE — PROGRESS NOTES
HPI:   Paola Liu is a pleasant 38 y.o. patient who reports to clinic for evaluation of left thigh pain. He describes deep thigh pain for about a week. He describes a lot of weakness in his quad.   He was complaining about low back pain a few weeks prior.  This resolved but he is noticed increased buckling and weakness in his left leg and left lower extremity.  Had a history of back pain dating back to 5 years prior.  Injury onset and description: no injury   Patient's occupation: railroad  This is not a work related injury.   This injury has been non-responsive to conservative care. The pain is worse with repetitive use, and strenuous activity is very difficult.  his pain improves with rest.  he rates pain as a  5/10on the Visual Analog Scale.        PAST MEDICAL HISTORY:   Past Medical History:   Diagnosis Date    ADHD      PAST SURGICAL HISTORY:   Past Surgical History:   Procedure Laterality Date    TONSILLECTOMY       MEDICATIONS:    Current Outpatient Medications:     amLODIPine (NORVASC) 5 MG tablet, Take 5 mg by mouth., Disp: , Rfl:     ibuprofen (ADVIL,MOTRIN) 800 MG tablet, Take 1 tablet (800 mg total) by mouth 3 (three) times daily., Disp: 20 tablet, Rfl: 0    azithromycin (ZITHROMAX Z-OSVALDO) 250 MG tablet, Take 2 tablets on day #1, then 1 tablet on days 2-5 (Patient not taking: Reported on 9/12/2023), Disp: 6 tablet, Rfl: 0    dextroamphetamine-amphetamine (ADDERALL) 20 mg tablet, Take 1 tablet by mouth 2 (two) times daily., Disp: , Rfl:     methylPREDNISolone (MEDROL DOSEPACK) 4 mg tablet, use as directed, Disp: 21 each, Rfl: 0    naproxen (NAPROSYN) 500 MG tablet, Take 1 tablet (500 mg total) by mouth 2 (two) times daily with meals., Disp: 60 tablet, Rfl: 3    pyrilamine-chlophedianoL (NINJACOF) 12.5-12.5 mg/5 mL Liqd, Take 10 mLs by mouth nightly as needed (cough). (Patient not taking: Reported on 9/12/2023), Disp: 473 mL, Rfl: 0  ALLERGIES:   Review of patient's allergies indicates:  No  Known Allergies      PHYSICAL EXAM:  VITAL SIGNS: There were no vitals taken for this visit.  General: Well-developed well-nourished 38 y.o. malein no acute distress;Cardiovascular: Regular rhythm by palpation of distal pulse, normal color and temperature, no concerning varicosities on symptomatic side Lungs: No labored breathing or wheezing appreciated Neuro: Alert and oriented ×3 Psychiatric: well oriented to person, place and time, demonstrates normal mood and affect Skin: No rashes, lesions or ulcers, normal temperature, turgor, and texture on uninvolved extremity    Ortho/SPM Exam  I inspected his low back and left lower extremity today.  Low back demonstrates satisfactory range of motion with no tenderness to palpation.  Able to perform a straight leg raise.  Reflexes were checked patellar reflexes demonstrate 2+ reflexes on the right side; 0 patellar reflex on the left.  Quad tone appears to be diminished on the left side.  Quad strength is graded at 4/5 on the left versus 5/5 on the right.  Left knee was examined negative Lachman's negative posterior drawer stable to varus and valgus stress.    IMAGING:  X-Ray Lumbar Spine AP And Lateral    Result Date: 5/28/2024  See Procedure Notes for results. IMPRESSION: Please see Ortho procedure notes for report.  This procedure was auto-finalized by: Virtual Radiologist  AP and lateral views lumbar spine demonstrates spondylosis at the level of L5-S1.  Normal lordosis of the lumbar spine is seen.  No fractures or pathologic bone demonstrated  X-Ray Femur 2 View Left    Result Date: 5/28/2024  See Procedure Notes for results. IMPRESSION: Please see Ortho procedure notes for report.  This procedure was auto-finalized by: Virtual Radiologist   Four views left femur were obtained today demonstrating normal bone mineralization no fracture no pathologic bone no osseous abnormality    ASSESSMENT:      ICD-10-CM ICD-9-CM   1. Left thigh pain  M79.652 729.5   2. Left-sided low  back pain without sciatica, unspecified chronicity  M54.50 724.2   3. Lumbar radiculopathy  M54.16 724.4       PLAN:     -Findings and treatment options were discussed with the patient  -All questions answered  He does have weakness in his reflexes were asymmetric.  I am concerned due to his history of back pain and recent history of back pain that this may be emanating from his low back he may have a herniated disc.  He does have a strenuous job.  I am going to start him on a Medrol Dosepak and naproxen but due to the duration of complaints which have been going on now for 6 weeks and this pronounced weakness which I see clinically and diminished reflexes on the left side I would like to obtain an MRI of the lumbar spine and see him back as soon as that is complete.    There are no Patient Instructions on file for this visit.  Orders Placed This Encounter   Procedures    X-Ray Lumbar Spine AP And Lateral     Standing Status:   Future     Number of Occurrences:   1     Standing Expiration Date:   5/28/2025     Order Specific Question:   May the Radiologist modify the order per protocol to meet the clinical needs of the patient?     Answer:   Yes    MRI Lumbar Spine Without Contrast     Standing Status:   Future     Standing Expiration Date:   5/28/2025     Order Specific Question:   Does the patient have or ever had a pacemaker or a defibrillator (Note: Some facilities may not be able to schedule an MRI for patients with pacemakers and defibrillators. You should contact your local radiology dept to determine if this is the case.)?     Answer:   No     Order Specific Question:   Does the patient have an aneurysm or surgical clip, pump, nerve/brain stimulator, middle/inner ear prosthesis, or other metal implant or foreign object (bullet, shrapnel)? If they have a card related to their implant, ask them to bring it. Issues related to the implant may cause the MRI to be delayed.     Answer:   No     Order Specific  Question:   Will the patient require sedation?     Answer:   No     Order Specific Question:   May the Radiologist modify the order per protocol to meet the clinical needs of the patient?     Answer:   Yes     Order Specific Question:   Recist criteria?     Answer:   Yes     Order Specific Question:   Does the patient have on a skin patch for medication with aluminized backing?     Answer:   No     Procedures

## 2024-06-14 ENCOUNTER — TELEPHONE (OUTPATIENT)
Dept: ORTHOPEDICS | Facility: CLINIC | Age: 39
End: 2024-06-14
Payer: COMMERCIAL

## 2024-06-14 DIAGNOSIS — M54.50 LEFT-SIDED LOW BACK PAIN WITHOUT SCIATICA, UNSPECIFIED CHRONICITY: ICD-10-CM

## 2024-06-14 DIAGNOSIS — M79.652 LEFT THIGH PAIN: Primary | ICD-10-CM

## 2024-06-14 NOTE — TELEPHONE ENCOUNTER
----- Message from Hay Craig MD sent at 6/13/2024  5:52 AM CDT -----  His back looks ok.  I may need to get more work up on his knee.  Can we call and see if his knee is doing any better?

## 2024-06-24 ENCOUNTER — CLINICAL SUPPORT (OUTPATIENT)
Dept: REHABILITATION | Facility: HOSPITAL | Age: 39
End: 2024-06-24
Payer: COMMERCIAL

## 2024-06-24 DIAGNOSIS — M79.652 LEFT THIGH PAIN: ICD-10-CM

## 2024-06-24 DIAGNOSIS — M62.81 WEAKNESS OF LEFT QUADRICEPS MUSCLE: Primary | ICD-10-CM

## 2024-06-24 DIAGNOSIS — M54.50 LEFT-SIDED LOW BACK PAIN WITHOUT SCIATICA, UNSPECIFIED CHRONICITY: ICD-10-CM

## 2024-06-24 DIAGNOSIS — R26.9 GAIT DISTURBANCE: ICD-10-CM

## 2024-06-24 DIAGNOSIS — G57.22 FEMORAL NERVE PALSY, LEFT: ICD-10-CM

## 2024-06-24 PROCEDURE — 97162 PT EVAL MOD COMPLEX 30 MIN: CPT

## 2024-06-24 PROCEDURE — 97112 NEUROMUSCULAR REEDUCATION: CPT

## 2024-06-24 NOTE — PLAN OF CARE
[facility]   Physical Therapy Initial Evaluation      Name: Paola Liu  Clinic Number: [id number]    Therapy Diagnosis:   Encounter Diagnoses   Name Primary?    Left thigh pain     Left-sided low back pain without sciatica, unspecified chronicity         Physician: Hay Craig MD    Physician Orders: PT Eval and Treat   Medical Diagnosis from Referral: see above  Evaluation Date: 6/24/2024  Authorization Period Expiration: 6/14/2025  Plan of Care Expiration: 8/9/2024    Date of Surgery: n/a  Visit # / Visits authorized: 1/30 then BMN   FOTO: 1/ 3    Precautions: Standard     Time In: 9:18 am  Time Out: 10:05 am  Total Billable Time: 47 minutes    Subjective     Date of onset: about 1 month ago    History of current condition - Paola reports: stepped in a hole about a month ago at work - had some immediate pain on the left side of the lower back - then 2-3 days later was loading the  and got a really sharp pain on that left side - couldn't tie his left shoe for a few days  - has lost some muscle mass and strength in the quad - says the left leg will not do what he wants it to do at times - MD noted diminished patellar tendon reflex - no more pain in the back - says he can't do quick movements as the left leg won't keep up    Falls: has not actually hit the ground but the left leg has buckled on him    Imaging: MRI lumbar spine:  FINDINGS:  Lumbar vertebral body heights and alignment appear maintained.  Minimal marginal vertebral body osteophyte formation noted at several levels.     Modic endplate changes: No significant Modic type endplate changes.     Intervertebral disc space levels:     L1-L2: No significant disc bulge, neuroforaminal narrowing or spinal canal stenosis.     L2-L3: No significant disc bulge, neuroforaminal narrowing or spinal canal stenosis.     L3-L4: No significant disc bulge, neuroforaminal narrowing or spinal canal stenosis.     L4-L5: No  significant disc bulge, neuroforaminal narrowing or spinal canal stenosis.     L5-S1: No significant disc bulge, neuroforaminal narrowing or spinal canal stenosis    Prior Therapy: none  Social History:  lives with their family  Occupation: works for the [employer]  Prior Level of Function: independent   Current Level of Function: independent - left lower extremity does buckle on him at times though    Pain:  Current 0/10,    Location: left   Description: n/a  Aggravating Factors: n/a  Easing Factors: n/a    Patients goals: get back full use of left lower extremity      Medical History:   Past Medical History:   Diagnosis Date    ADHD        Surgical History:   Paola Liu  has a past surgical history that includes Tonsillectomy.    Medications:   Paola has a current medication list which includes the following prescription(s): amlodipine, azithromycin, dextroamphetamine-amphetamine, ibuprofen, naproxen, and ninjacof.    Allergies:   Review of patient's allergies indicates:  No Known Allergies     Objective        Range of motion:  Motion Right Left    Knee extension  WITHIN FUNCTIONAL LIMITS  WITHIN FUNCTIONAL LIMITS   Knee flexion  WITHIN FUNCTIONAL LIMITS  WITHIN FUNCTIONAL LIMITS       Manual muscle test   Muscle Right  Left    Knee extension  MMT strength: 5/5  MMT strength: 4/5   Knee flexion  MMT strength: 5/5  MMT strength: 4/5       Gait:  Weight bearing precautions: FWB  Assistive device: none  Ambulation distance and deviations: none noted in clinic but states he cannot make quick movements or the left leg wants to give out or does not respond quickly  Stairs: decreased eccentric quad control on left   Comments:      Clinical Special Tests:    Knee  Anterior drawer: right Negative left Negative  Posterior drawer: right Negative left Negative  Varus stress test: right Negative left Negative  Valgus stress test: right Negative left Negative  PFJ grind test: right Negative left Negative  McMurrays:  right Negative left Negative  Thessaly's: right Negative left Negative      Comments: no pain - feels very weak doing any sort of squatting, especially single leg   Patellar tendon reflex - absent on left, normal on right  Straight leg raise - able initially but begins to lag after about 3-4 seconds  Difficulty with eccentric lowering on left lower extremity     Intake Outcome Measure for FOTO Shoulder Survey    Therapist reviewed FOTO scores for Paola Liu on 6/24/2024.   FOTO report - see Media section or FOTO account episode details.    Intake Score: 79         Treatment     Total Treatment time (time-based codes) separate from Evaluation: 15 minutes     Paola received the treatments listed below:      Straight leg raise - [place] style w/ 5 second holds, open chain terminal knee extension w/ towel roll and 5 second holds, single leg glute bridge holds, lateral step downs    Patient Education and Home Exercises     Education provided:   - evaluation results, plan of care and home exercise program     Written Home Exercises Provided: yes. Exercises were reviewed and Paola was able to demonstrate them prior to the end of the session.  Paola demonstrated good  understanding of the education provided. See EMR under Patient Instructions for exercises provided during therapy sessions.    Assessment     Paola is a 38 y.o. male referred to outpatient Physical Therapy with a medical diagnosis of left quad weakness. Patient presents with quad weakness in the left lower extremity since stepping in a hole. He has had an MRI of the lumbar spine and there is no evidence of disc herniation or nerve root impingement at the spinal level. Uncertain if there could be some sort of impingement at the femoro-acetabular joint or around the inguinal ligament as a result of his stepping in the hole. Will work on stretching hip flexors, quad strengthening and femoral nerve flossing to address the issues. He may benefit  from a nerve conduction study if the symptoms do not improve. He did not have any pain in the knee with any special tests or exercises for his home exercise program.     Patient prognosis is Good.   Patient will benefit from skilled outpatient Physical Therapy to address the deficits stated above and in the chart below, provide patient /family education, and to maximize patientt's level of independence.     Plan of care discussed with patient: Yes  Patient's spiritual, cultural and educational needs considered and patient is agreeable to the plan of care and goals as stated below:     Anticipated Barriers for therapy: possible femoral nerve palsy/entrapment    Medical Necessity is demonstrated by the following  History  Co-morbidities and personal factors that may impact the plan of care [] LOW: no personal factors / co-morbidities  [x] MODERATE: 1-2 personal factors / co-morbidities  [] HIGH: 3+ personal factors / co-morbidities    Moderate / High Support Documentation:   Co-morbidities affecting plan of care:   Past Medical History:   Diagnosis Date    ADHD        Personal Factors:   no deficits     Examination  Body Structures and Functions, activity limitations and participation restrictions that may impact the plan of care [] LOW: addressing 1-2 elements  [x] MODERATE: 3+ elements  [] HIGH: 4+ elements (please support below)    Moderate / High Support Documentation: quad weakness, decreased PTR, gait disturbance, possible femoral nerve palsy or entrapment     Clinical Presentation [] LOW: stable  [x] MODERATE: Evolving  [] HIGH: Unstable     Decision Making/ Complexity Score: moderate         SHORT TERM GOALS  Patient to be independent with home exercise program to facilitate carryover between therapy visits.  Patient will perform straight leg raise and hold 10 seconds without quad lag for increased quad control.  Patient will increase manual muscle test of left lower extremity to 5/5 for increased stability  with gait and activiites of daily living.  Patient will go up/down stairs reciprocal pattern without handrails and good eccentric control on left lower extremity.  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  Patient will be able to run for recreation with his children without left knee buckling.    Plan     Plan of care Certification: 6/24/2024 to 8/9/2024.    Outpatient Physical Therapy 2 times weekly for 6 weeks to include the following interventions: Cervical/Lumbar Traction, Electrical Stimulation NMES, Gait Training, Manual Therapy, Moist Heat/ Ice, Neuromuscular Re-ed, Patient Education, Therapeutic Activities, and Therapeutic Exercise.     Patient may only be able to come once weekly due to work schedule.    NAHUM MAC, PT  6/24/2024      Physician's Signature: _________________________________________ Date: ________________

## 2024-07-01 ENCOUNTER — CLINICAL SUPPORT (OUTPATIENT)
Dept: REHABILITATION | Facility: HOSPITAL | Age: 39
End: 2024-07-01
Payer: COMMERCIAL

## 2024-07-01 DIAGNOSIS — M62.81 WEAKNESS OF LEFT QUADRICEPS MUSCLE: Primary | ICD-10-CM

## 2024-07-01 DIAGNOSIS — R26.9 GAIT DISTURBANCE: ICD-10-CM

## 2024-07-01 DIAGNOSIS — G57.22 FEMORAL NERVE PALSY, LEFT: ICD-10-CM

## 2024-07-01 PROCEDURE — 97112 NEUROMUSCULAR REEDUCATION: CPT | Mod: CQ

## 2024-07-01 PROCEDURE — 97110 THERAPEUTIC EXERCISES: CPT | Mod: CQ

## 2024-07-01 NOTE — PROGRESS NOTES
"[facility]   Physical Therapy Treatment Note      Name: Paola Liu  Clinic Number: [id number]    Therapy Diagnosis:   Encounter Diagnoses   Name Primary?    Weakness of left quadriceps muscle Yes    Gait disturbance     Femoral nerve palsy, left      Physician: Hay Craig MD    Visit Date: 7/1/2024    Physician Orders: PT Eval and Treat   Medical Diagnosis from Referral: see above  Evaluation Date: 6/24/2024  Authorization Period Expiration: 6/14/2025  Plan of Care Expiration: 8/9/2024     Date of Surgery: n/a  Visit # / Visits authorized: 2/30 then BMN   FOTO: 1/ 3     Precautions: Standard     PTA Visit #: 1/5     Time In: 8:30 am  Time Out: 9:17 am  Total Billable Time: 47 minutes    Subjective     Pt reports: No pain in his back... reports "My leg just don't work right".  He was compliant with home exercise program.  Response to previous treatment: no complaints   Functional change: no change noted    Pain: 0/10  Location: low back and L LE      Objective      Objective Measures updated at progress report unless specified.     Treatment     Paola received the treatments listed below:      therapeutic exercises to develop strength, endurance, ROM, flexibility, posture, and core stabilization for 24 minutes including:  Bike x 5 min  Straight leg raise - [place] style w/ 10 second holds--4 x 5   open chain terminal knee extension w/ towel roll and 5 second holds--3 x 10--3#  single leg glute bridge holds--2 x 10  Bruce Stretch--3 x 1 min  Femoral nerve floss--3 x 10 reps  Hurdler stretch--throughout session--30 sec hold  Standing TKE--6 plates--3 x 10  1/2 kneeling hip flexion stretch--3 x 30 sec    manual therapy techniques: Joint mobilizations, Manual traction, Myofacial release, Soft tissue Mobilization, and Friction Massage were applied to the:   L knee for 0 minutes, including:  -    neuromuscular re-education activities to improve: Balance, Coordination, " "Kinesthetic Sense, Proprioception, and Posture for 23 minutes. The following activities were included:  lateral step downs--6"--4 x 5  Forward step downs--6"--4 x 5  Unilateral Leg press--7 plates--3 x 10    therapeutic activities to improve functional performance for 0  minutes, including:  -  j  Patient Education and Home Exercises       Education provided:   - review of home exercise program and current Plan of Care/rationale of treatment.    Written Home Exercises Provided: Patient instructed to cont prior HEP. Exercises were reviewed and Paola was able to demonstrate them prior to the end of the session.  Paola demonstrated good understanding of the education provided. See EMR under Patient Instructions for exercises provided during therapy sessions    Assessment     At Evaluation:  Paola is a 38 y.o. male referred to outpatient Physical Therapy with a medical diagnosis of left quad weakness. Patient presents with quad weakness in the left lower extremity since stepping in a hole. He has had an MRI of the lumbar spine and there is no evidence of disc herniation or nerve root impingement at the spinal level. Uncertain if there could be some sort of impingement at the femoro-acetabular joint or around the inguinal ligament as a result of his stepping in the hole. Will work on stretching hip flexors, quad strengthening and femoral nerve flossing to address the issues. He may benefit from a nerve conduction study if the symptoms do not improve. He did not have any pain in the knee with any special tests or exercises for his home exercise program    Current Assessment:  Arrived without pain in low back or LLE.  Reports his chief complaint is weakness in LLE.  Reports his knee buckled while playing baseball over the weekend.  Started with Bike.  Spent session working on anterior hip flexibility and terminal knee extension.  Added some anterior hip stretches and Femoral nreve glides to HEP.  Cont POC    Paola " Is progressing towards his goals.   Pt prognosis is Good.     Pt will continue to benefit from skilled outpatient physical therapy to address the deficits listed in the problem list box on initial evaluation, provide pt/family education and to maximize pt's level of independence in the home and community environment.     Pt's spiritual, cultural and educational needs considered and pt agreeable to plan of care and goals.     Anticipated barriers to physical therapy: none    Goals:   Patient to be independent with home exercise program to facilitate carryover between therapy visits.  Patient will perform straight leg raise and hold 10 seconds without quad lag for increased quad control.  Patient will increase manual muscle test of left lower extremity to 5/5 for increased stability with gait and activiites of daily living.  Patient will go up/down stairs reciprocal pattern without handrails and good eccentric control on left lower extremity.  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  Patient will be able to run for recreation with his children without left knee buckling.    Plan     Plan of care Certification: 6/24/2024 to 8/9/2024.     Outpatient Physical Therapy 2 times weekly for 6 weeks to include the following interventions: Cervical/Lumbar Traction, Electrical Stimulation NMES, Gait Training, Manual Therapy, Moist Heat/ Ice, Neuromuscular Re-ed, Patient Education, Therapeutic Activities, and Therapeutic Exercise.     Landon Riley, PTA   07/01/2024

## 2024-07-08 ENCOUNTER — CLINICAL SUPPORT (OUTPATIENT)
Dept: REHABILITATION | Facility: HOSPITAL | Age: 39
End: 2024-07-08
Payer: COMMERCIAL

## 2024-07-08 DIAGNOSIS — G57.22 FEMORAL NERVE PALSY, LEFT: ICD-10-CM

## 2024-07-08 DIAGNOSIS — M62.81 WEAKNESS OF LEFT QUADRICEPS MUSCLE: Primary | ICD-10-CM

## 2024-07-08 DIAGNOSIS — R26.9 GAIT DISTURBANCE: ICD-10-CM

## 2024-07-08 PROCEDURE — 97112 NEUROMUSCULAR REEDUCATION: CPT | Mod: CQ

## 2024-07-08 PROCEDURE — 97110 THERAPEUTIC EXERCISES: CPT | Mod: CQ

## 2024-07-08 NOTE — PROGRESS NOTES
"[facility]   Physical Therapy Treatment Note      Name: Paola Liu  Clinic Number: [id number]    Therapy Diagnosis:   Encounter Diagnoses   Name Primary?    Weakness of left quadriceps muscle Yes    Gait disturbance     Femoral nerve palsy, left      Physician: Hay Craig MD    Visit Date: 7/8/2024    Physician Orders: PT Eval and Treat   Medical Diagnosis from Referral: see above  Evaluation Date: 6/24/2024  Authorization Period Expiration: 6/14/2025  Plan of Care Expiration: 8/9/2024     Date of Surgery: n/a  Visit # / Visits authorized: 3/30 then BMN   FOTO: 1/ 3     Precautions: Standard     PTA Visit #: 2/5     Time In: 8:34 am  Time Out: 9:15 am  Total Billable Time: 31 billable minutes (9 non billed minutes)    Subjective     Pt reports: No pain in his back... reports "My leg just don't work right".  He was not compliant with home exercise program since last visit  Response to previous treatment: no complaints   Functional change: Less buclking    Pain: 0/10  Location: low back and L LE      Objective      Objective Measures updated at progress report unless specified.     Treatment     Bony received the treatments listed below:      therapeutic exercises to develop strength, endurance, ROM, flexibility, posture, and core stabilization for 23 minutes including:  Bike x 5 min  Straight leg raise - [place] style w/ 10 second holds--4 x 5--2#  open chain terminal knee extension w/ towel roll and 5 second holds--3 x 10--5#  single leg glute bridge holds--2 x 10  Bruce Stretch--3 x 1 min  Femoral nerve floss--3 x 10 reps  Hurdler stretch--throughout session--30 sec hold  Standing TKE--6 plates--3 x 10  1/2 kneeling hip flexion stretch--3 x 30 sec    manual therapy techniques: Joint mobilizations, Manual traction, Myofacial release, Soft tissue Mobilization, and Friction Massage were applied to the:   L knee for 0 minutes, including:  -    neuromuscular re-education " "activities to improve: Balance, Coordination, Kinesthetic Sense, Proprioception, and Posture for 8 minutes. The following activities were included:  lateral step downs--6"--4 x 5  Forward step downs--6"--4 x 5  Unilateral Leg press--7 plates--3 x 10  Knee and hip ext on LP--6 plates--2 x 10  High box step ups--20"--2 x 10    therapeutic activities to improve functional performance for 0  minutes, including:  -    Patient Education and Home Exercises       Education provided:   - review of home exercise program and current Plan of Care/rationale of treatment.    Written Home Exercises Provided: Patient instructed to cont prior HEP. Exercises were reviewed and Bony was able to demonstrate them prior to the end of the session.  Bony demonstrated good understanding of the education provided. See EMR under Patient Instructions for exercises provided during therapy sessions    Assessment     At Evaluation:  Paola is a 38 y.o. male referred to outpatient Physical Therapy with a medical diagnosis of left quad weakness. Patient presents with quad weakness in the left lower extremity since stepping in a hole. He has had an MRI of the lumbar spine and there is no evidence of disc herniation or nerve root impingement at the spinal level. Uncertain if there could be some sort of impingement at the femoro-acetabular joint or around the inguinal ligament as a result of his stepping in the hole. Will work on stretching hip flexors, quad strengthening and femoral nerve flossing to address the issues. He may benefit from a nerve conduction study if the symptoms do not improve. He did not have any pain in the knee with any special tests or exercises for his home exercise program    Current Assessment:  Arrived without pain in low back or LLE.  Reports his chief complaint is weakness in LLE.  Reports his knee did not buckle as much playing baseball yesterday.   Started with Bike.  Spent session working on anterior hip flexibility " and terminal knee extension.  Added mor quad activities without issue.  Cont POC    Bony Is progressing towards his goals.   Pt prognosis is Good.     Pt will continue to benefit from skilled outpatient physical therapy to address the deficits listed in the problem list box on initial evaluation, provide pt/family education and to maximize pt's level of independence in the home and community environment.     Pt's spiritual, cultural and educational needs considered and pt agreeable to plan of care and goals.     Anticipated barriers to physical therapy: none    Goals:   Patient to be independent with home exercise program to facilitate carryover between therapy visits.  Patient will perform straight leg raise and hold 10 seconds without quad lag for increased quad control.  Patient will increase manual muscle test of left lower extremity to 5/5 for increased stability with gait and activiites of daily living.  Patient will go up/down stairs reciprocal pattern without handrails and good eccentric control on left lower extremity.  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  Patient will be able to run for recreation with his children without left knee buckling.    Plan     Plan of care Certification: 6/24/2024 to 8/9/2024.     Outpatient Physical Therapy 2 times weekly for 6 weeks to include the following interventions: Cervical/Lumbar Traction, Electrical Stimulation NMES, Gait Training, Manual Therapy, Moist Heat/ Ice, Neuromuscular Re-ed, Patient Education, Therapeutic Activities, and Therapeutic Exercise.     Landon Riley, PTA   07/08/2024

## 2024-09-17 ENCOUNTER — CLINICAL SUPPORT (OUTPATIENT)
Dept: PRIMARY CARE CLINIC | Facility: CLINIC | Age: 39
End: 2024-09-17

## 2024-09-17 DIAGNOSIS — Z01.10 ENCOUNTER FOR HEARING EXAMINATION, UNSPECIFIED WHETHER ABNORMAL FINDINGS: ICD-10-CM

## 2024-09-17 DIAGNOSIS — Z01.00 ENCOUNTER FOR VISION SCREENING: ICD-10-CM

## 2024-09-17 DIAGNOSIS — Z02.83 ENCOUNTER FOR DRUG SCREENING: Primary | ICD-10-CM

## 2024-09-17 PROCEDURE — 99000 SPECIMEN HANDLING OFFICE-LAB: CPT | Mod: ,,, | Performed by: NURSE PRACTITIONER

## 2024-09-17 PROCEDURE — 92552 PURE TONE AUDIOMETRY AIR: CPT | Mod: ,,, | Performed by: NURSE PRACTITIONER

## 2024-09-17 PROCEDURE — 99172 OCULAR FUNCTION SCREEN: CPT | Mod: ,,, | Performed by: NURSE PRACTITIONER

## 2024-09-17 NOTE — PROGRESS NOTES
Subjective     Patient ID: Paola Liu is a 39 y.o. male.    Chief Complaint: No chief complaint on file.    HPI  Review of Systems       Objective     Physical Exam       Assessment and Plan     1. Encounter for drug screening    2. Encounter for hearing examination, unspecified whether abnormal findings    3. Encounter for vision screening        See scanned documents in .            No follow-ups on file.

## 2024-10-01 ENCOUNTER — DOCUMENTATION ONLY (OUTPATIENT)
Dept: REHABILITATION | Facility: HOSPITAL | Age: 39
End: 2024-10-01
Payer: COMMERCIAL

## 2024-10-01 PROBLEM — M62.81 WEAKNESS OF LEFT QUADRICEPS MUSCLE: Status: RESOLVED | Noted: 2024-06-24 | Resolved: 2024-10-01

## 2024-10-01 PROBLEM — R26.9 GAIT DISTURBANCE: Status: RESOLVED | Noted: 2024-06-24 | Resolved: 2024-10-01

## 2024-10-01 PROBLEM — G57.22: Status: RESOLVED | Noted: 2024-06-24 | Resolved: 2024-10-01

## 2024-10-01 NOTE — PROGRESS NOTES
[facility]   Physical Therapy Treatment Note       Name: Paola Liu  Clinic Number: [id number]     Therapy Diagnosis:        Encounter Diagnoses   Name Primary?    Weakness of left quadriceps muscle Yes    Gait disturbance      Femoral nerve palsy, left        Physician: Hay Craig MD     Last Visit Date: 7/8/2024     Physician Orders: PT Eval and Treat   Medical Diagnosis from Referral: see above  Evaluation Date: 6/24/2024  Authorization Period Expiration: 6/14/2025  Plan of Care Expiration: 8/9/2024     Date of Surgery: n/a  Visit # / Visits authorized: 3/30 then BMN   FOTO: 1/ 3     Precautions: Standard     Last physical therapy treatment performed by Landon Riley PTA.    Assessment    From last treatment note: Arrived without pain in low back or LLE.  Reports his chief complaint is weakness in LLE.  Reports his knee did not buckle as much playing baseball yesterday.   Started with Bike.  Spent session working on anterior hip flexibility and terminal knee extension.  Added mor quad activities without issue.        Discharge reason: Patient has not attended therapy since 7/8/2024. He was called and stated he was doing better and requested to d/c physical therapy.    Plan   This patient is discharged from Physical Therapy effective 7/8/24 per his request.    NAHUM MAC, PT  10/1/2024

## 2025-07-01 ENCOUNTER — OFFICE VISIT (OUTPATIENT)
Dept: FAMILY MEDICINE | Facility: CLINIC | Age: 40
End: 2025-07-01
Payer: COMMERCIAL

## 2025-07-01 VITALS
DIASTOLIC BLOOD PRESSURE: 80 MMHG | OXYGEN SATURATION: 97 % | TEMPERATURE: 98 F | BODY MASS INDEX: 27.6 KG/M2 | RESPIRATION RATE: 16 BRPM | SYSTOLIC BLOOD PRESSURE: 128 MMHG | WEIGHT: 175.81 LBS | HEART RATE: 66 BPM | HEIGHT: 67 IN

## 2025-07-01 DIAGNOSIS — H10.31 ACUTE CONJUNCTIVITIS OF RIGHT EYE, UNSPECIFIED ACUTE CONJUNCTIVITIS TYPE: ICD-10-CM

## 2025-07-01 DIAGNOSIS — B34.9 VIRAL ILLNESS: Primary | ICD-10-CM

## 2025-07-01 PROBLEM — S02.129A: Status: RESOLVED | Noted: 2018-11-21 | Resolved: 2025-07-01

## 2025-07-01 PROBLEM — S02.19XA: Status: RESOLVED | Noted: 2018-11-21 | Resolved: 2025-07-01

## 2025-07-01 PROCEDURE — 3008F BODY MASS INDEX DOCD: CPT | Mod: ,,, | Performed by: NURSE PRACTITIONER

## 2025-07-01 PROCEDURE — 99213 OFFICE O/P EST LOW 20 MIN: CPT | Mod: 25,,, | Performed by: NURSE PRACTITIONER

## 2025-07-01 PROCEDURE — 3074F SYST BP LT 130 MM HG: CPT | Mod: ,,, | Performed by: NURSE PRACTITIONER

## 2025-07-01 PROCEDURE — 1160F RVW MEDS BY RX/DR IN RCRD: CPT | Mod: ,,, | Performed by: NURSE PRACTITIONER

## 2025-07-01 PROCEDURE — 1159F MED LIST DOCD IN RCRD: CPT | Mod: ,,, | Performed by: NURSE PRACTITIONER

## 2025-07-01 PROCEDURE — 3079F DIAST BP 80-89 MM HG: CPT | Mod: ,,, | Performed by: NURSE PRACTITIONER

## 2025-07-01 PROCEDURE — 96372 THER/PROPH/DIAG INJ SC/IM: CPT | Mod: ,,, | Performed by: NURSE PRACTITIONER

## 2025-07-01 RX ORDER — POLYMYXIN B SULFATE AND TRIMETHOPRIM 1; 10000 MG/ML; [USP'U]/ML
1 SOLUTION OPHTHALMIC EVERY 6 HOURS
Qty: 10 ML | Refills: 0 | Status: SHIPPED | OUTPATIENT
Start: 2025-07-01

## 2025-07-01 RX ORDER — DEXAMETHASONE SODIUM PHOSPHATE 4 MG/ML
4 INJECTION, SOLUTION INTRA-ARTICULAR; INTRALESIONAL; INTRAMUSCULAR; INTRAVENOUS; SOFT TISSUE
Status: COMPLETED | OUTPATIENT
Start: 2025-07-01 | End: 2025-07-01

## 2025-07-01 RX ORDER — PROMETHAZINE HYDROCHLORIDE AND DEXTROMETHORPHAN HYDROBROMIDE 6.25; 15 MG/5ML; MG/5ML
5 SYRUP ORAL EVERY 4 HOURS PRN
Qty: 473 ML | Refills: 0 | Status: SHIPPED | OUTPATIENT
Start: 2025-07-01

## 2025-07-01 RX ORDER — METHYLPREDNISOLONE ACETATE 40 MG/ML
40 INJECTION, SUSPENSION INTRA-ARTICULAR; INTRALESIONAL; INTRAMUSCULAR; SOFT TISSUE
Status: COMPLETED | OUTPATIENT
Start: 2025-07-01 | End: 2025-07-01

## 2025-07-01 RX ADMIN — METHYLPREDNISOLONE ACETATE 40 MG: 40 INJECTION, SUSPENSION INTRA-ARTICULAR; INTRALESIONAL; INTRAMUSCULAR; SOFT TISSUE at 09:07

## 2025-07-01 RX ADMIN — DEXAMETHASONE SODIUM PHOSPHATE 4 MG: 4 INJECTION, SOLUTION INTRA-ARTICULAR; INTRALESIONAL; INTRAMUSCULAR; INTRAVENOUS; SOFT TISSUE at 09:07

## 2025-07-01 NOTE — PROGRESS NOTES
Subjective     Patient ID: Paola Liu is a 39 y.o. male.    Chief Complaint: URI, Cough, and Conjunctivitis (Feeling bad x 2 days no fever. Right eye matted and irritated.)    Pt presents with upper resp symptoms x 2 days and matted right eye.       Review of Systems   Constitutional:  Negative for activity change, appetite change, fatigue and fever.   HENT:  Positive for nasal congestion, rhinorrhea and sinus pressure/congestion. Negative for nosebleeds, postnasal drip, sneezing and sore throat.    Eyes:  Positive for discharge. Negative for pain and itching.   Respiratory:  Positive for cough. Negative for chest tightness, shortness of breath, wheezing and stridor.    Cardiovascular:  Negative for chest pain.   Gastrointestinal:  Negative for abdominal pain.   Genitourinary:  Negative for dysuria.   Musculoskeletal:  Negative for back pain.   Neurological:  Negative for dizziness and headaches.   Psychiatric/Behavioral:  Negative for behavioral problems and confusion.           Objective     Physical Exam  Vitals and nursing note reviewed.   Constitutional:       Appearance: Normal appearance.   HENT:      Head: Normocephalic.      Right Ear: Tympanic membrane normal.      Left Ear: Tympanic membrane normal.      Nose: Congestion present.      Mouth/Throat:      Mouth: Mucous membranes are moist.   Eyes:      General:         Right eye: Discharge present.   Cardiovascular:      Rate and Rhythm: Normal rate and regular rhythm.      Heart sounds: Normal heart sounds.   Pulmonary:      Effort: Pulmonary effort is normal.      Breath sounds: Normal breath sounds.   Musculoskeletal:         General: Normal range of motion.   Neurological:      Mental Status: He is alert and oriented to person, place, and time.   Psychiatric:         Mood and Affect: Mood normal.         Behavior: Behavior normal.            Assessment and Plan     1. Viral illness  -     dexAMETHasone injection 4 mg  -     methylPREDNISolone  acetate injection 40 mg  -     promethazine-dextromethorphan (PROMETHAZINE-DM) 6.25-15 mg/5 mL Syrp; Take 5 mLs by mouth every 4 (four) hours as needed (cough).  Dispense: 473 mL; Refill: 0    2. Acute conjunctivitis of right eye, unspecified acute conjunctivitis type  -     polymyxin B sulf-trimethoprim (POLYTRIM) 10,000 unit- 1 mg/mL Drop; Place 1 drop into the right eye every 6 (six) hours.  Dispense: 10 mL; Refill: 0        Notify clinic if symptoms worsen or persist.          Follow up if symptoms worsen or fail to improve.

## 2025-07-01 NOTE — LETTER
July 1, 2025      [facility]  [address] [place] [address]  Phone: [phone]  Fax: [phone]       Patient: Paola Liu   YOB: 1985  Date of Visit: 07/01/2025    To Whom It May Concern:    Donell Liu  was at [facility] on 07/01/2025. The patient may return to work/school on 7/2/2025 with no restrictions. If you have any questions or concerns, or if I can be of further assistance, please do not hesitate to contact me.    Sincerely,    RADHA Tamayo